# Patient Record
Sex: MALE | Race: WHITE | NOT HISPANIC OR LATINO | Employment: FULL TIME | URBAN - NONMETROPOLITAN AREA
[De-identification: names, ages, dates, MRNs, and addresses within clinical notes are randomized per-mention and may not be internally consistent; named-entity substitution may affect disease eponyms.]

---

## 2018-06-01 ENCOUNTER — OFFICE VISIT (OUTPATIENT)
Dept: CARDIOLOGY | Facility: CLINIC | Age: 58
End: 2018-06-01

## 2018-06-01 VITALS
WEIGHT: 292 LBS | OXYGEN SATURATION: 98 % | SYSTOLIC BLOOD PRESSURE: 142 MMHG | HEART RATE: 75 BPM | BODY MASS INDEX: 41.8 KG/M2 | HEIGHT: 70 IN | DIASTOLIC BLOOD PRESSURE: 93 MMHG

## 2018-06-01 DIAGNOSIS — I87.2 VENOUS INSUFFICIENCY: Primary | ICD-10-CM

## 2018-06-01 DIAGNOSIS — I83.813 VARICOSE VEINS OF BOTH LOWER EXTREMITIES WITH PAIN: ICD-10-CM

## 2018-06-01 PROCEDURE — 99204 OFFICE O/P NEW MOD 45 MIN: CPT | Performed by: INTERNAL MEDICINE

## 2018-06-01 PROCEDURE — 93000 ELECTROCARDIOGRAM COMPLETE: CPT | Performed by: INTERNAL MEDICINE

## 2018-06-01 RX ORDER — MAGNESIUM OXIDE 400 MG/1
400 TABLET ORAL DAILY
COMMUNITY

## 2018-06-01 RX ORDER — IBUPROFEN 600 MG/1
600 TABLET ORAL EVERY 6 HOURS PRN
COMMUNITY

## 2018-06-01 NOTE — PROGRESS NOTES
[facility] CARDIOLOGY  [address] [place] [address]  [place] [address]  Phone: [phone]  Fax: [phone]    06/01/2018    Chief Complaint: lower extremity swelling, Varicose veins. Leg pain    History:   Leo Da Silva is a 57 y.o. male seen in consultation, referred by Dr.Mohammad Sol MD  for Venous Insufficiency. He has suffered from bilateral leg pain and swelling for several months and tried pressure stockings which have not worked. He denies ulceration and has been taking pain pills to help his leg pain.   He has had a Nuclear stress test in 2015 which was normal.    Past Medical History:   Diagnosis Date   • CHF (congestive heart failure)    • Hypertension    • Sleep apnea    • Venous insufficiency        Review of Systems:  Please see HPI  Constitution: No chills, no rigors, no unexplained weight loss or weight gain  Eyes:  No diplopia, no blurred vision, no loss of vision, conjunctiva is pink and sclera is anicteric  ENT:  No tinnitus, no otorrhea, no epistaxis, no sore throat   Respiratory: No cough, no hemoptysis  Cardiovascular: see HPI  Gastrointestinal: No nausea, no vomiting, no hematemesis, no diarrhea or constipation, no melena  Genitourinary: No frequency of dysuria no hematuria  Integument: No pruritis and  no skin rash  Hematologic / Lymphatic: No excessive bleeding, easy bruising, fatigue, lymphadenopathy and petechiae  Musculoskeletal: No joint pain, joint stiffness, joint swelling, muscle pain, muscle weakness and neck pain  Neurological: No dizziness, headaches, light headedness, seizures and vertigo  Endocrine: No frequent urination and nocturia, temperature intolerance, weight gain, unintended and weight loss, unintended        Past Social History:  Social History     Social History   • Marital status:      Social History Main Topics   • Smoking status: Never Smoker   • Alcohol use No   • Drug use: No   • Sexual activity: Defer     Other Topics Concern   •  Not on file       Past Family History:  History reviewed. No pertinent family history.    Current Outpatient Prescriptions on File Prior to Visit   Medication Sig Dispense Refill   • potassium chloride (K-DUR) 10 MEQ CR tablet Take 10 mEq by mouth every night.     • pramipexole (MIRAPEX) 0.5 MG tablet Take 1 mg by mouth Every Night.     • ramipril (ALTACE) 2.5 MG capsule Take 2.5 mg by mouth daily.     • metaxalone (SKELAXIN) 800 MG tablet Take 1 tablet by mouth 3 (three) times a day as needed for muscle spasms. 15 tablet 0   • naproxen (EC NAPROSYN) 500 MG EC tablet Take 1 tablet by mouth 2 (two) times a day with meals. 20 tablet 0   • traMADol (ULTRAM) 50 MG tablet Take 50 mg by mouth every night.     • [DISCONTINUED] Triamterene-HCTZ (MAXZIDE-25 PO) Take 37 mg by mouth daily.       No current facility-administered medications on file prior to visit.        No Known Allergies    Objective:  There were no vitals filed for this visit.      Comfortable NAD  PERRL, conjunctiva clear  Neck supple, no JVD or thyromegaly appreciated  S1/S2 RRR, no m/r/g  Lungs CTA B, normal effort  Abdomen S/NT/ND (+) BS, no HSM appreciated  Extremities warm, no clubbing, cyanosis, or edema  Normal gait  No visible or palpable skin lesions  A/Ox4, mood and affect appropriate  Pulse exam:   BILATERAL LEG EDEMA LEFT WAS WORSE.  BILAT VARICOSE VEINS NOTED RIGHT WORSE  STRONG 3+ dp AND PT PULSES    DATA:       Results for orders placed in visit on 05/19/15   SCANNED - ECHOCARDIOGRAM       Results for orders placed during the hospital encounter of 06/26/15   Stress test with myocardial perfusion    Narrative Patient:      ANNE-MARIE HUANG    Med Rec#:     [id number]            :          1960            Date:         2015            Age:          54y                   Account#:     [id number]           Height:       177.8 cm / 70.0 in  Accession#:   [id number]               Sex:          M                      Weight:       129.54 kg  / 285.5 lbs  BSA:          2.43  Admit Date#:  06/26/2015            Loc:          exam room 1    Referring:    KERMIT  Referring:    KERMIT  Performing:   DG  Reading:      Prabhakar Calderon MD  Reading:      Cristhian Beaver MD   [employer] [place]  Nuclear [facility] Dann Elise CNMT  ______________________________________________________________________    Combined Nuclear/Stress Test    ICD Codes:     • 786.50 Chest pain, unspecified     Checklists:     • Patient verbally identified self  • Patient identified by ID band  • Patient consent obtained in lab  • Procedure verified and explained to patient  • History and physical performed  • Last Caffeine 06/25/2015 19:00:00  • Last Meal 06/25/2015 19:00:00    History of:   • Hypertension• Family History of CHF• Typical Angina•  Paroxysmal nocturnal dyspnea• Dyspnea with minimal exertion• Dyspnea  with normal daily activity• Dyspnea with more than normal activityNo  History of:• Dyslipidemia• Reactive Airway Disease• Chronic Lung  Disease• Renal Failure• CHF• Coronary Artery Disease (CAD)• Peripheral  Vascular Disease• Cerebrovascular Disease• Family History of CAD  Current Clinical Presentation:  The patient presented with typical angina.     Medications I :  • SEE PT. MED. LIST         Discharge At Completion of Exam :  • Home     Baseline ECG:  Normal sinus rhythm. Normal repolarization.   Exercise Protocol:  The patient exercised for a total of 07:30 min using the Standard Ángel  exercise protocol, achieving stage 3 and a max METs of 10.1. Maximum  heart rate was 148 bpm, 90% MPHR. RPP 86657.   Stress ECG :  Sinus tachycardia. ST segment response to stress was normal.   Recovery ECG:  Sinus tachycardia. Borderline ST-segment depression (0.5 - 0.9mm).   Hemodynamics                   Rest        Stress        Recovery      SBP         128 mmHg    160 mmHg      136 mmHg      DBP         79 mmHg     77 mmHg       75 mmHg         HR =       62 bpm      148 bpm       86 bpm        %MPHR                   90 %                          Imaging Protocol:  A one day rest-stress imaging protocol was followed using Tc-99m  sestamibi (Cardiolite) injected intravenously. For the rest portion of  the study, 10.5 mCi of the radiopharmaceutical was administered at  2015 07:50:03. For the stress portion of the study, 30.4 mCi was  administered at 2015 09:45:10.     Perfusion Interpretation:  TID Ratio 0.98.     Wall Motion Interpretation:  Gated imaging under post-stress conditions demonstrated normal wall  motion.   The patient's calculated post stress LVEF was 70%. The patient's end  diastolic volume was 133ml. The patient's end systolic volume was  39ml.     Nuclear Cardiology Conclusion:  Normal LV perfusion.  Normal regadenoson myocardial perfusion   with Tc-99m sestamibi imaging.   Stress testing induced no chest pain symptoms and a non-diagnostic ECG  response.  Normal LV size. Global left ventricular systolic function was  normal, with an EF of 70%.  In addition, there was normal wall motion.  No prior study was available for comparison.       Conclusions  *Normal regadenoson myocardial perfusion   with Tc-99m sestamibi  imaging.  *Stress testing induced no chest pain symptoms and a non-diagnostic ECG  response.  *Global left ventricular systolic function was normal, with an EF of  70%.  *No prior study was available for comparison.  *Gated imaging under post-stress conditions demonstrated normal wall  motion.      Electronically signed by: Cristhian Beaver MD on 2015  16:01:10  Thank you for the courtesy of this referral.  Amended Report    Patient:      ANNE-MARIE HUANG   Med Rec#:     [id number]            :          1960            Date:         2015            Age:          54y                   Account#:     [id number]           Height:       177.8 cm / 70.0 in  Accession#:   [id number]               Sex:           M                      Weight:       129.54 kg / 285.5 lbs  BSA:          2.43  Admit Date#:  06/26/2015            Loc:          exam room 1    Referring:    KERMIT  Referring:    KERMIT  Performing:   DG  Reading:      Prabhakar Calderon MD  Reading:      Cristhian Beaver MD   [employer] [place]  Nuclear Med Dann Elise CNMT  ______________________________________________________________________    Combined Nuclear/Stress Test    ICD Codes:     • 786.50 Chest pain, unspecified     Checklists:     • Patient verbally identified self  • Patient identified by ID band  • Patient consent obtained in lab  • Procedure verified and explained to patient  • History and physical performed  • Last Caffeine 06/25/2015 19:00:00  • Last Meal 06/25/2015 19:00:00    History of:   • Hypertension• Family History of CHF• Typical Angina•  Paroxysmal nocturnal dyspnea• Dyspnea with minimal exertion• Dyspnea  with normal daily activity• Dyspnea with more than normal activityNo  History of:• Dyslipidemia• Reactive Airway Disease• Chronic Lung  Disease• Renal Failure• CHF• Coronary Artery Disease (CAD)• Peripheral  Vascular Disease• Cerebrovascular Disease• Family History of CAD  Current Clinical Presentation:  The patient presented with typical angina.     Medications I :  • SEE PT. MED. LIST         Discharge At Completion of Exam :  • Home     Baseline ECG:  Normal sinus rhythm. Normal repolarization.   Exercise Protocol:  The patient exercised for a total of 07:30 min using the Standard Ángel  exercise protocol, achieving stage 3 and a max METs of 10.1. Maximum  heart rate was 148 bpm, 90% MPHR. RPP 97005.   Stress ECG :  Sinus tachycardia. ST segment response to stress was normal.   Recovery ECG:  Sinus tachycardia. Borderline ST-segment depression (0.5 - 0.9mm).   Hemodynamics                   Rest        Stress        Recovery      SBP         128 mmHg    160 mmHg      136 mmHg       DBP         79 mmHg     77 mmHg       75 mmHg        HR =       62 bpm      148 bpm       86 bpm        %MPHR                   90 %                          Imaging Protocol:  A one day rest-stress imaging protocol was followed using Tc-99m  sestamibi (Cardiolite) injected intravenously. For the rest portion of  the study, 10.5 mCi of the radiopharmaceutical was administered at  2015 07:50:03. For the stress portion of the study, 30.4 mCi was  administered at 2015 09:45:10.     Perfusion Interpretation:  TID Ratio 0.98.     Wall Motion Interpretation:  Gated imaging under post-stress conditions demonstrated normal wall  motion.   The patient's calculated post stress LVEF was 70%. The patient's end  diastolic volume was 133ml. The patient's end systolic volume was  39ml.     Nuclear Cardiology Conclusion:  Normal LV perfusion.  Normal exercise myocardial perfusion   with Tc-99m sestamibi imaging.   Stress testing induced no chest pain symptoms and a non-diagnostic ECG  response.  Normal LV size. Global left ventricular systolic function was  normal, with an EF of 70%.  In addition, there was normal wall motion.  No prior study was available for comparison.       Conclusions  *Normal exercise myocardial perfusion   with Tc-99m sestamibi imaging.  *Stress testing induced no chest pain symptoms and a non-diagnostic ECG  response.  *Global left ventricular systolic function was normal, with an EF of  70%.  *No prior study was available for comparison.  *Gated imaging under post-stress conditions demonstrated normal wall  motion.      Electronically signed by: Cristhian Beaver MD on 2015  08:37:45  Thank you for the courtesy of this referral.       Results for orders placed during the hospital encounter of 06/26/15   Stress test with myocardial perfusion    Narrative Patient:      ANNE-MARIE HUANG    Med Rec#:     [id number]            :          1960            Date:         2015             Age:          54y                   Account#:     [id number]           Height:       177.8 cm / 70.0 in  Accession#:   [id number]               Sex:          M                      Weight:       129.54 kg / 285.5 lbs  BSA:          2.43  Admit Date#:  06/26/2015            Loc:          exam room 1    Referring:    ESTELASANTIAGOJANELL  Referring:    ESTELAJAZIELVON  Performing:   DG  Reading:      Prabhakar Calderon MD  Reading:      Cristhian Beaver MD   [employer] [place]  Nuclear [facility] Dann Elise CNMT  ______________________________________________________________________    Combined Nuclear/Stress Test    ICD Codes:     • 786.50 Chest pain, unspecified     Checklists:     • Patient verbally identified self  • Patient identified by ID band  • Patient consent obtained in lab  • Procedure verified and explained to patient  • History and physical performed  • Last Caffeine 06/25/2015 19:00:00  • Last Meal 06/25/2015 19:00:00    History of:   • Hypertension• Family History of CHF• Typical Angina•  Paroxysmal nocturnal dyspnea• Dyspnea with minimal exertion• Dyspnea  with normal daily activity• Dyspnea with more than normal activityNo  History of:• Dyslipidemia• Reactive Airway Disease• Chronic Lung  Disease• Renal Failure• CHF• Coronary Artery Disease (CAD)• Peripheral  Vascular Disease• Cerebrovascular Disease• Family History of CAD  Current Clinical Presentation:  The patient presented with typical angina.     Medications I :  • SEE PT. MED. LIST         Discharge At Completion of Exam :  • Home     Baseline ECG:  Normal sinus rhythm. Normal repolarization.   Exercise Protocol:  The patient exercised for a total of 07:30 min using the Standard Ángel  exercise protocol, achieving stage 3 and a max METs of 10.1. Maximum  heart rate was 148 bpm, 90% MPHR. RPP 24348.   Stress ECG :  Sinus tachycardia. ST segment response to stress was normal.   Recovery ECG:  Sinus tachycardia. Borderline  ST-segment depression (0.5 - 0.9mm).   Hemodynamics                   Rest        Stress        Recovery      SBP         128 mmHg    160 mmHg      136 mmHg      DBP         79 mmHg     77 mmHg       75 mmHg        HR =       62 bpm      148 bpm       86 bpm        %MPHR                   90 %                          Imaging Protocol:  A one day rest-stress imaging protocol was followed using Tc-99m  sestamibi (Cardiolite) injected intravenously. For the rest portion of  the study, 10.5 mCi of the radiopharmaceutical was administered at  2015 07:50:03. For the stress portion of the study, 30.4 mCi was  administered at 2015 09:45:10.     Perfusion Interpretation:  TID Ratio 0.98.     Wall Motion Interpretation:  Gated imaging under post-stress conditions demonstrated normal wall  motion.   The patient's calculated post stress LVEF was 70%. The patient's end  diastolic volume was 133ml. The patient's end systolic volume was  39ml.     Nuclear Cardiology Conclusion:  Normal LV perfusion.  Normal regadenoson myocardial perfusion   with Tc-99m sestamibi imaging.   Stress testing induced no chest pain symptoms and a non-diagnostic ECG  response.  Normal LV size. Global left ventricular systolic function was  normal, with an EF of 70%.  In addition, there was normal wall motion.  No prior study was available for comparison.       Conclusions  *Normal regadenoson myocardial perfusion   with Tc-99m sestamibi  imaging.  *Stress testing induced no chest pain symptoms and a non-diagnostic ECG  response.  *Global left ventricular systolic function was normal, with an EF of  70%.  *No prior study was available for comparison.  *Gated imaging under post-stress conditions demonstrated normal wall  motion.      Electronically signed by: Cristhian Beaver MD on 2015  16:01:10  Thank you for the courtesy of this referral.  Amended Report    Patient:      ANNE-MARIE HUANG   Med Rec#:     [id number]            :           1960            Date:         06/26/2015            Age:          54y                   Account#:     [id number]           Height:       177.8 cm / 70.0 in  Accession#:   [id number]               Sex:          M                      Weight:       129.54 kg / 285.5 lbs  BSA:          2.43  Admit Date#:  06/26/2015            Loc:          exam room 1    Referring:    KERMIT  Referring:    KERMIT  Performing:   DG  Reading:      Prabhakar Calderon MD  Reading:      Cristhian Beaver MD   [employer] [place]  Nuclear Med Dann Elise CNMT  ______________________________________________________________________    Combined Nuclear/Stress Test    ICD Codes:     • 786.50 Chest pain, unspecified     Checklists:     • Patient verbally identified self  • Patient identified by ID band  • Patient consent obtained in lab  • Procedure verified and explained to patient  • History and physical performed  • Last Caffeine 06/25/2015 19:00:00  • Last Meal 06/25/2015 19:00:00    History of:   • Hypertension• Family History of CHF• Typical Angina•  Paroxysmal nocturnal dyspnea• Dyspnea with minimal exertion• Dyspnea  with normal daily activity• Dyspnea with more than normal activityNo  History of:• Dyslipidemia• Reactive Airway Disease• Chronic Lung  Disease• Renal Failure• CHF• Coronary Artery Disease (CAD)• Peripheral  Vascular Disease• Cerebrovascular Disease• Family History of CAD  Current Clinical Presentation:  The patient presented with typical angina.     Medications I :  • SEE PT. MED. LIST         Discharge At Completion of Exam :  • Home     Baseline ECG:  Normal sinus rhythm. Normal repolarization.   Exercise Protocol:  The patient exercised for a total of 07:30 min using the Standard Ángel  exercise protocol, achieving stage 3 and a max METs of 10.1. Maximum  heart rate was 148 bpm, 90% MPHR. RPP 73908.   Stress ECG :  Sinus tachycardia. ST segment response to stress was  normal.   Recovery ECG:  Sinus tachycardia. Borderline ST-segment depression (0.5 - 0.9mm).   Hemodynamics                   Rest        Stress        Recovery      SBP         128 mmHg    160 mmHg      136 mmHg      DBP         79 mmHg     77 mmHg       75 mmHg        HR =       62 bpm      148 bpm       86 bpm        %MPHR                   90 %                          Imaging Protocol:  A one day rest-stress imaging protocol was followed using Tc-99m  sestamibi (Cardiolite) injected intravenously. For the rest portion of  the study, 10.5 mCi of the radiopharmaceutical was administered at  06/26/2015 07:50:03. For the stress portion of the study, 30.4 mCi was  administered at 06/26/2015 09:45:10.     Perfusion Interpretation:  TID Ratio 0.98.     Wall Motion Interpretation:  Gated imaging under post-stress conditions demonstrated normal wall  motion.   The patient's calculated post stress LVEF was 70%. The patient's end  diastolic volume was 133ml. The patient's end systolic volume was  39ml.     Nuclear Cardiology Conclusion:  Normal LV perfusion.  Normal exercise myocardial perfusion   with Tc-99m sestamibi imaging.   Stress testing induced no chest pain symptoms and a non-diagnostic ECG  response.  Normal LV size. Global left ventricular systolic function was  normal, with an EF of 70%.  In addition, there was normal wall motion.  No prior study was available for comparison.       Conclusions  *Normal exercise myocardial perfusion   with Tc-99m sestamibi imaging.  *Stress testing induced no chest pain symptoms and a non-diagnostic ECG  response.  *Global left ventricular systolic function was normal, with an EF of  70%.  *No prior study was available for comparison.  *Gated imaging under post-stress conditions demonstrated normal wall  motion.      Electronically signed by: Cristhian Beaver MD on 06/30/2015  08:37:45  Thank you for the courtesy of this referral.              ECG 12 Lead  Date/Time:  6/1/2018 11:05 AM  Performed by: LYRIC BONILLA  Authorized by: LYRIC BONILLA            A/P:  Bilateral Varicose Veins advanced symptomatic  Bilateral leg edema left worse than right  Failed Pressure stockings for greater than 3 months  No evidence of PAD  Normal LV function 70%    PLAN  Bilateral venous reflux ultrasound  Schedule for [facility] on June 20        Thank you for allowing me to participate in the care of Leo BRYCE Da Silva. Feel free to contact me directly with any further questions or concerns.

## 2018-06-13 ENCOUNTER — APPOINTMENT (OUTPATIENT)
Dept: CARDIOLOGY | Facility: HOSPITAL | Age: 58
End: 2018-06-13
Attending: INTERNAL MEDICINE

## 2018-06-13 DIAGNOSIS — I87.2 VENOUS INSUFFICIENCY: Primary | ICD-10-CM

## 2018-06-14 ENCOUNTER — HOSPITAL ENCOUNTER (OUTPATIENT)
Dept: CARDIOLOGY | Facility: HOSPITAL | Age: 58
Discharge: HOME | End: 2018-06-14
Attending: INTERNAL MEDICINE | Admitting: INTERNAL MEDICINE

## 2018-06-14 PROCEDURE — 93970 EXTREMITY STUDY: CPT | Performed by: RADIOLOGY

## 2018-06-14 PROCEDURE — 93970 EXTREMITY STUDY: CPT

## 2018-06-19 ENCOUNTER — APPOINTMENT (OUTPATIENT)
Dept: CARDIOLOGY | Facility: HOSPITAL | Age: 58
End: 2018-06-19
Attending: INTERNAL MEDICINE

## 2018-08-14 ENCOUNTER — TELEPHONE (OUTPATIENT)
Dept: CARDIOLOGY | Facility: CLINIC | Age: 58
End: 2018-08-14

## 2018-08-14 DIAGNOSIS — I87.2 VENOUS INSUFFICIENCY: Primary | ICD-10-CM

## 2018-08-14 NOTE — TELEPHONE ENCOUNTER
----- Message from Eulalio Cole MD sent at 8/7/2018 10:52 AM EDT -----  Needs reflux study with times.

## 2018-08-14 NOTE — TELEPHONE ENCOUNTER
Called Leo to let him know that per Dr. Cole the test that was previously done we will need to have redone so that we can collect additional data. He should expect a call from scheduling to get this set up. He states he has an appointment with Dr. Horton on 8/17/18 at 4:30. I told him I would make a note of this in the order so that scheduling can be made aware.     He understands and is aware.

## 2018-08-21 ENCOUNTER — HOSPITAL ENCOUNTER (OUTPATIENT)
Dept: CARDIOLOGY | Facility: HOSPITAL | Age: 58
Discharge: HOME | End: 2018-08-21
Attending: INTERNAL MEDICINE | Admitting: INTERNAL MEDICINE

## 2018-08-21 DIAGNOSIS — I87.2 VENOUS INSUFFICIENCY: ICD-10-CM

## 2018-08-21 PROCEDURE — 93970 EXTREMITY STUDY: CPT | Performed by: RADIOLOGY

## 2018-08-21 PROCEDURE — 93970 EXTREMITY STUDY: CPT

## 2020-03-24 ENCOUNTER — LAB REQUISITION (OUTPATIENT)
Dept: LAB | Facility: HOSPITAL | Age: 60
End: 2020-03-24

## 2020-03-24 DIAGNOSIS — Z00.00 ROUTINE GENERAL MEDICAL EXAMINATION AT A HEALTH CARE FACILITY: ICD-10-CM

## 2020-03-24 PROCEDURE — 86706 HEP B SURFACE ANTIBODY: CPT | Performed by: NURSE PRACTITIONER

## 2020-03-25 LAB — HBV SURFACE AB SER RIA-ACNC: REACTIVE

## 2020-12-11 ENCOUNTER — TRANSCRIBE ORDERS (OUTPATIENT)
Dept: ADMINISTRATIVE | Facility: HOSPITAL | Age: 60
End: 2020-12-11

## 2020-12-11 ENCOUNTER — HOSPITAL ENCOUNTER (OUTPATIENT)
Dept: GENERAL RADIOLOGY | Facility: HOSPITAL | Age: 60
Discharge: HOME | End: 2020-12-11
Admitting: NURSE PRACTITIONER

## 2020-12-11 DIAGNOSIS — M25.562 LEFT KNEE PAIN, UNSPECIFIED CHRONICITY: Primary | ICD-10-CM

## 2020-12-11 DIAGNOSIS — M25.562 LEFT KNEE PAIN, UNSPECIFIED CHRONICITY: ICD-10-CM

## 2020-12-11 PROCEDURE — 73562 X-RAY EXAM OF KNEE 3: CPT | Performed by: RADIOLOGY

## 2020-12-11 PROCEDURE — 73562 X-RAY EXAM OF KNEE 3: CPT

## 2021-02-11 ENCOUNTER — IMMUNIZATION (OUTPATIENT)
Dept: VACCINE CLINIC | Facility: HOSPITAL | Age: 61
End: 2021-02-11

## 2021-02-11 PROCEDURE — 0001A: CPT | Performed by: INTERNAL MEDICINE

## 2021-02-11 PROCEDURE — 91300 HC SARSCOV02 VAC 30MCG/0.3ML IM: CPT | Performed by: INTERNAL MEDICINE

## 2021-03-04 ENCOUNTER — IMMUNIZATION (OUTPATIENT)
Dept: VACCINE CLINIC | Facility: HOSPITAL | Age: 61
End: 2021-03-04

## 2021-03-04 PROCEDURE — 91300 HC SARSCOV02 VAC 30MCG/0.3ML IM: CPT | Performed by: INTERNAL MEDICINE

## 2021-03-04 PROCEDURE — 0002A: CPT | Performed by: INTERNAL MEDICINE

## 2024-06-18 ENCOUNTER — APPOINTMENT (OUTPATIENT)
Dept: CT IMAGING | Facility: HOSPITAL | Age: 64
End: 2024-06-18
Payer: COMMERCIAL

## 2024-06-18 ENCOUNTER — HOSPITAL ENCOUNTER (EMERGENCY)
Facility: HOSPITAL | Age: 64
Discharge: HOME | End: 2024-06-19
Attending: STUDENT IN AN ORGANIZED HEALTH CARE EDUCATION/TRAINING PROGRAM
Payer: COMMERCIAL

## 2024-06-18 DIAGNOSIS — K42.9 PERIUMBILICAL HERNIA: Primary | ICD-10-CM

## 2024-06-18 LAB
ALBUMIN SERPL-MCNC: 4.2 G/DL (ref 3.5–5.2)
ALBUMIN/GLOB SERPL: 1.4 G/DL
ALP SERPL-CCNC: 85 U/L (ref 39–117)
ALT SERPL W P-5'-P-CCNC: 36 U/L (ref 1–41)
ANION GAP SERPL CALCULATED.3IONS-SCNC: 12.7 MMOL/L (ref 5–15)
AST SERPL-CCNC: 28 U/L (ref 1–40)
BASOPHILS # BLD AUTO: 0.03 10*3/MM3 (ref 0–0.2)
BASOPHILS NFR BLD AUTO: 0.3 % (ref 0–1.5)
BILIRUB SERPL-MCNC: 0.9 MG/DL (ref 0–1.2)
BILIRUB UR QL STRIP: NEGATIVE
BUN SERPL-MCNC: 9 MG/DL (ref 8–23)
BUN/CREAT SERPL: 9.2 (ref 7–25)
CALCIUM SPEC-SCNC: 9.4 MG/DL (ref 8.6–10.5)
CHLORIDE SERPL-SCNC: 101 MMOL/L (ref 98–107)
CLARITY UR: CLEAR
CO2 SERPL-SCNC: 22.3 MMOL/L (ref 22–29)
COLOR UR: YELLOW
CREAT SERPL-MCNC: 0.98 MG/DL (ref 0.76–1.27)
CRP SERPL-MCNC: 1.68 MG/DL (ref 0–0.5)
D-LACTATE SERPL-SCNC: 1.5 MMOL/L (ref 0.5–2)
DEPRECATED RDW RBC AUTO: 45.8 FL (ref 37–54)
EGFRCR SERPLBLD CKD-EPI 2021: 85.6 ML/MIN/1.73
EOSINOPHIL # BLD AUTO: 0.01 10*3/MM3 (ref 0–0.4)
EOSINOPHIL NFR BLD AUTO: 0.1 % (ref 0.3–6.2)
ERYTHROCYTE [DISTWIDTH] IN BLOOD BY AUTOMATED COUNT: 12.2 % (ref 12.3–15.4)
GLOBULIN UR ELPH-MCNC: 3 GM/DL
GLUCOSE SERPL-MCNC: 117 MG/DL (ref 65–99)
GLUCOSE UR STRIP-MCNC: NEGATIVE MG/DL
HCT VFR BLD AUTO: 45.2 % (ref 37.5–51)
HGB BLD-MCNC: 16 G/DL (ref 13–17.7)
HGB UR QL STRIP.AUTO: NEGATIVE
HOLD SPECIMEN: NORMAL
HOLD SPECIMEN: NORMAL
IMM GRANULOCYTES # BLD AUTO: 0.11 10*3/MM3 (ref 0–0.05)
IMM GRANULOCYTES NFR BLD AUTO: 1.2 % (ref 0–0.5)
KETONES UR QL STRIP: ABNORMAL
LEUKOCYTE ESTERASE UR QL STRIP.AUTO: NEGATIVE
LIPASE SERPL-CCNC: 20 U/L (ref 13–60)
LYMPHOCYTES # BLD AUTO: 1.31 10*3/MM3 (ref 0.7–3.1)
LYMPHOCYTES NFR BLD AUTO: 14 % (ref 19.6–45.3)
MCH RBC QN AUTO: 35.9 PG (ref 26.6–33)
MCHC RBC AUTO-ENTMCNC: 35.4 G/DL (ref 31.5–35.7)
MCV RBC AUTO: 101.3 FL (ref 79–97)
MONOCYTES # BLD AUTO: 0.56 10*3/MM3 (ref 0.1–0.9)
MONOCYTES NFR BLD AUTO: 6 % (ref 5–12)
NEUTROPHILS NFR BLD AUTO: 7.35 10*3/MM3 (ref 1.7–7)
NEUTROPHILS NFR BLD AUTO: 78.4 % (ref 42.7–76)
NITRITE UR QL STRIP: NEGATIVE
NRBC BLD AUTO-RTO: 0 /100 WBC (ref 0–0.2)
PH UR STRIP.AUTO: 5.5 [PH] (ref 5–8)
PLATELET # BLD AUTO: 168 10*3/MM3 (ref 140–450)
PMV BLD AUTO: 10.8 FL (ref 6–12)
POTASSIUM SERPL-SCNC: 4.3 MMOL/L (ref 3.5–5.2)
PROT SERPL-MCNC: 7.2 G/DL (ref 6–8.5)
PROT UR QL STRIP: NEGATIVE
RBC # BLD AUTO: 4.46 10*6/MM3 (ref 4.14–5.8)
SODIUM SERPL-SCNC: 136 MMOL/L (ref 136–145)
SP GR UR STRIP: >1.03 (ref 1–1.03)
UROBILINOGEN UR QL STRIP: ABNORMAL
WBC NRBC COR # BLD AUTO: 9.37 10*3/MM3 (ref 3.4–10.8)
WHOLE BLOOD HOLD COAG: NORMAL
WHOLE BLOOD HOLD SPECIMEN: NORMAL

## 2024-06-18 PROCEDURE — 36415 COLL VENOUS BLD VENIPUNCTURE: CPT | Performed by: STUDENT IN AN ORGANIZED HEALTH CARE EDUCATION/TRAINING PROGRAM

## 2024-06-18 PROCEDURE — 25010000002 ONDANSETRON PER 1 MG: Performed by: EMERGENCY MEDICINE

## 2024-06-18 PROCEDURE — 25010000002 MORPHINE PER 10 MG: Performed by: STUDENT IN AN ORGANIZED HEALTH CARE EDUCATION/TRAINING PROGRAM

## 2024-06-18 PROCEDURE — 96375 TX/PRO/DX INJ NEW DRUG ADDON: CPT

## 2024-06-18 PROCEDURE — 86140 C-REACTIVE PROTEIN: CPT | Performed by: PHYSICIAN ASSISTANT

## 2024-06-18 PROCEDURE — 25010000002 ONDANSETRON PER 1 MG

## 2024-06-18 PROCEDURE — 83605 ASSAY OF LACTIC ACID: CPT | Performed by: STUDENT IN AN ORGANIZED HEALTH CARE EDUCATION/TRAINING PROGRAM

## 2024-06-18 PROCEDURE — 96374 THER/PROPH/DIAG INJ IV PUSH: CPT

## 2024-06-18 PROCEDURE — 80053 COMPREHEN METABOLIC PANEL: CPT | Performed by: STUDENT IN AN ORGANIZED HEALTH CARE EDUCATION/TRAINING PROGRAM

## 2024-06-18 PROCEDURE — 25510000001 IOPAMIDOL 61 % SOLUTION: Performed by: STUDENT IN AN ORGANIZED HEALTH CARE EDUCATION/TRAINING PROGRAM

## 2024-06-18 PROCEDURE — 96376 TX/PRO/DX INJ SAME DRUG ADON: CPT

## 2024-06-18 PROCEDURE — 74177 CT ABD & PELVIS W/CONTRAST: CPT

## 2024-06-18 PROCEDURE — 83690 ASSAY OF LIPASE: CPT | Performed by: STUDENT IN AN ORGANIZED HEALTH CARE EDUCATION/TRAINING PROGRAM

## 2024-06-18 PROCEDURE — 93010 ELECTROCARDIOGRAM REPORT: CPT | Performed by: INTERNAL MEDICINE

## 2024-06-18 PROCEDURE — 25010000002 MORPHINE PER 10 MG: Performed by: EMERGENCY MEDICINE

## 2024-06-18 PROCEDURE — 25810000003 SODIUM CHLORIDE 0.9 % SOLUTION

## 2024-06-18 PROCEDURE — 99285 EMERGENCY DEPT VISIT HI MDM: CPT

## 2024-06-18 PROCEDURE — 85025 COMPLETE CBC W/AUTO DIFF WBC: CPT | Performed by: STUDENT IN AN ORGANIZED HEALTH CARE EDUCATION/TRAINING PROGRAM

## 2024-06-18 PROCEDURE — 93005 ELECTROCARDIOGRAM TRACING: CPT

## 2024-06-18 PROCEDURE — 81003 URINALYSIS AUTO W/O SCOPE: CPT | Performed by: STUDENT IN AN ORGANIZED HEALTH CARE EDUCATION/TRAINING PROGRAM

## 2024-06-18 RX ORDER — OXYCODONE AND ACETAMINOPHEN 10; 325 MG/1; MG/1
1 TABLET ORAL ONCE
Status: DISCONTINUED | OUTPATIENT
Start: 2024-06-18 | End: 2024-06-19 | Stop reason: HOSPADM

## 2024-06-18 RX ORDER — ONDANSETRON 2 MG/ML
4 INJECTION INTRAMUSCULAR; INTRAVENOUS ONCE
Status: COMPLETED | OUTPATIENT
Start: 2024-06-18 | End: 2024-06-18

## 2024-06-18 RX ORDER — ONDANSETRON 2 MG/ML
4 INJECTION INTRAMUSCULAR; INTRAVENOUS ONCE
Status: DISCONTINUED | OUTPATIENT
Start: 2024-06-18 | End: 2024-06-18

## 2024-06-18 RX ORDER — HYDROCODONE BITARTRATE AND ACETAMINOPHEN 5; 325 MG/1; MG/1
1 TABLET ORAL ONCE
Status: COMPLETED | OUTPATIENT
Start: 2024-06-18 | End: 2024-06-18

## 2024-06-18 RX ORDER — SODIUM CHLORIDE 0.9 % (FLUSH) 0.9 %
10 SYRINGE (ML) INJECTION AS NEEDED
Status: DISCONTINUED | OUTPATIENT
Start: 2024-06-18 | End: 2024-06-19 | Stop reason: HOSPADM

## 2024-06-18 RX ADMIN — MORPHINE SULFATE 4 MG: 4 INJECTION, SOLUTION INTRAMUSCULAR; INTRAVENOUS at 23:36

## 2024-06-18 RX ADMIN — HYDROCODONE BITARTRATE AND ACETAMINOPHEN 1 TABLET: 5; 325 TABLET ORAL at 21:35

## 2024-06-18 RX ADMIN — MORPHINE SULFATE 4 MG: 4 INJECTION, SOLUTION INTRAMUSCULAR; INTRAVENOUS at 18:31

## 2024-06-18 RX ADMIN — SODIUM CHLORIDE 1000 ML: 9 INJECTION, SOLUTION INTRAVENOUS at 18:31

## 2024-06-18 RX ADMIN — IOPAMIDOL 87 ML: 612 INJECTION, SOLUTION INTRAVENOUS at 19:10

## 2024-06-18 RX ADMIN — ONDANSETRON 4 MG: 2 INJECTION INTRAMUSCULAR; INTRAVENOUS at 18:31

## 2024-06-18 RX ADMIN — ONDANSETRON 4 MG: 2 INJECTION INTRAMUSCULAR; INTRAVENOUS at 23:35

## 2024-06-18 NOTE — ED PROVIDER NOTES
Subjective   History of Present Illness  Patient is a 63-year-old male with no significant past medical history who presents today with complaints of abdominal pain.  Patient reports that he has not been to a doctor in several years, however supposed to take antihypertensives which he has not taken in several years.  Patient reports that he had a hernia mesh placed 4 to 5 years ago and has had no complications however began having umbilical abdominal pain this morning.  Patient reports that he had to pick his mother-in-law up out of the floor a couple days ago but denies injuring himself at this time.  Patient reports some nausea and vomiting however denies any diarrhea, fever, or any other symptoms.  Patient also denies any dysuria, hematuria, any increased frequency.  Patient is alert and oriented and able to answer all questions appropriately.  Patient presents private vehicle with wife at bedside.    History provided by:  Patient   used: No        Review of Systems   Constitutional: Negative.  Negative for fever.   HENT: Negative.     Respiratory: Negative.     Cardiovascular: Negative.  Negative for chest pain.   Gastrointestinal:  Positive for abdominal pain and nausea.   Endocrine: Negative.    Genitourinary: Negative.  Negative for dysuria.   Skin: Negative.    Neurological: Negative.    Psychiatric/Behavioral: Negative.     All other systems reviewed and are negative.      Past Medical History:   Diagnosis Date    CHF (congestive heart failure)     Hypertension     Sleep apnea     Venous insufficiency        No Known Allergies    Past Surgical History:   Procedure Laterality Date    HERNIA REPAIR         Family History   Problem Relation Age of Onset    Heart disease Mother        Social History     Socioeconomic History    Marital status:    Tobacco Use    Smoking status: Never   Substance and Sexual Activity    Alcohol use: No    Drug use: No    Sexual activity: Defer            Objective   Physical Exam  Vitals and nursing note reviewed.   Constitutional:       General: He is not in acute distress.     Appearance: He is well-developed. He is not diaphoretic.   HENT:      Head: Normocephalic and atraumatic.      Right Ear: External ear normal.      Left Ear: External ear normal.      Nose: Nose normal.   Eyes:      Conjunctiva/sclera: Conjunctivae normal.      Pupils: Pupils are equal, round, and reactive to light.   Neck:      Vascular: No JVD.      Trachea: No tracheal deviation.   Cardiovascular:      Rate and Rhythm: Normal rate and regular rhythm.      Heart sounds: Normal heart sounds. No murmur heard.  Pulmonary:      Effort: Pulmonary effort is normal. No respiratory distress.      Breath sounds: Normal breath sounds. No wheezing.   Abdominal:      General: Bowel sounds are normal.      Palpations: Abdomen is soft.      Tenderness: There is abdominal tenderness in the periumbilical area.   Musculoskeletal:         General: No deformity. Normal range of motion.      Cervical back: Normal range of motion and neck supple.   Skin:     General: Skin is warm and dry.      Coloration: Skin is not pale.      Findings: No erythema or rash.   Neurological:      Mental Status: He is alert and oriented to person, place, and time.      Cranial Nerves: No cranial nerve deficit.   Psychiatric:         Behavior: Behavior normal.         Thought Content: Thought content normal.         Procedures       Results for orders placed or performed during the hospital encounter of 06/18/24   Comprehensive Metabolic Panel    Specimen: Blood   Result Value Ref Range    Glucose 117 (H) 65 - 99 mg/dL    BUN 9 8 - 23 mg/dL    Creatinine 0.98 0.76 - 1.27 mg/dL    Sodium 136 136 - 145 mmol/L    Potassium 4.3 3.5 - 5.2 mmol/L    Chloride 101 98 - 107 mmol/L    CO2 22.3 22.0 - 29.0 mmol/L    Calcium 9.4 8.6 - 10.5 mg/dL    Total Protein 7.2 6.0 - 8.5 g/dL    Albumin 4.2 3.5 - 5.2 g/dL    ALT (SGPT) 36 1 -  41 U/L    AST (SGOT) 28 1 - 40 U/L    Alkaline Phosphatase 85 39 - 117 U/L    Total Bilirubin 0.9 0.0 - 1.2 mg/dL    Globulin 3.0 gm/dL    A/G Ratio 1.4 g/dL    BUN/Creatinine Ratio 9.2 7.0 - 25.0    Anion Gap 12.7 5.0 - 15.0 mmol/L    eGFR 85.6 >60.0 mL/min/1.73   Lipase    Specimen: Blood   Result Value Ref Range    Lipase 20 13 - 60 U/L   Urinalysis With Microscopic If Indicated (No Culture) - Urine, Clean Catch    Specimen: Urine, Clean Catch   Result Value Ref Range    Color, UA Yellow Yellow, Straw    Appearance, UA Clear Clear    pH, UA 5.5 5.0 - 8.0    Specific Gravity, UA >1.030 (H) 1.005 - 1.030    Glucose, UA Negative Negative    Ketones, UA Trace (A) Negative    Bilirubin, UA Negative Negative    Blood, UA Negative Negative    Protein, UA Negative Negative    Leuk Esterase, UA Negative Negative    Nitrite, UA Negative Negative    Urobilinogen, UA 0.2 E.U./dL 0.2 - 1.0 E.U./dL   Lactic Acid, Plasma    Specimen: Blood   Result Value Ref Range    Lactate 1.5 0.5 - 2.0 mmol/L   CBC Auto Differential    Specimen: Blood   Result Value Ref Range    WBC 9.37 3.40 - 10.80 10*3/mm3    RBC 4.46 4.14 - 5.80 10*6/mm3    Hemoglobin 16.0 13.0 - 17.7 g/dL    Hematocrit 45.2 37.5 - 51.0 %    .3 (H) 79.0 - 97.0 fL    MCH 35.9 (H) 26.6 - 33.0 pg    MCHC 35.4 31.5 - 35.7 g/dL    RDW 12.2 (L) 12.3 - 15.4 %    RDW-SD 45.8 37.0 - 54.0 fl    MPV 10.8 6.0 - 12.0 fL    Platelets 168 140 - 450 10*3/mm3    Neutrophil % 78.4 (H) 42.7 - 76.0 %    Lymphocyte % 14.0 (L) 19.6 - 45.3 %    Monocyte % 6.0 5.0 - 12.0 %    Eosinophil % 0.1 (L) 0.3 - 6.2 %    Basophil % 0.3 0.0 - 1.5 %    Immature Grans % 1.2 (H) 0.0 - 0.5 %    Neutrophils, Absolute 7.35 (H) 1.70 - 7.00 10*3/mm3    Lymphocytes, Absolute 1.31 0.70 - 3.10 10*3/mm3    Monocytes, Absolute 0.56 0.10 - 0.90 10*3/mm3    Eosinophils, Absolute 0.01 0.00 - 0.40 10*3/mm3    Basophils, Absolute 0.03 0.00 - 0.20 10*3/mm3    Immature Grans, Absolute 0.11 (H) 0.00 - 0.05 10*3/mm3     nRBC 0.0 0.0 - 0.2 /100 WBC   C-reactive Protein    Specimen: Blood   Result Value Ref Range    C-Reactive Protein 1.68 (H) 0.00 - 0.50 mg/dL   ECG 12 Lead Other; epigastric pain   Result Value Ref Range    QT Interval 402 ms    QTC Interval 430 ms   Green Top (Gel)   Result Value Ref Range    Extra Tube Hold for add-ons.    Lavender Top   Result Value Ref Range    Extra Tube hold for add-on    Gold Top - SST   Result Value Ref Range    Extra Tube Hold for add-ons.    Light Blue Top   Result Value Ref Range    Extra Tube Hold for add-ons.        CT Abdomen Pelvis With Contrast   Final Result       Periumbilical hernia measuring 15 mm, containing mesenteric fat and mild   inflammatory change.  No incarcerated or strangulated loop of bowel.           This report was finalized on 6/18/2024 7:48 PM by Marissa Mccarty MD.                ED Course  ED Course as of 06/19/24 0011   Tue Jun 18, 2024 1929 EKG at 1917 showed sinus rhythm, rate 69.  FL interval 186, QRS duration 84, QTc 430 ms.  No apparent acute ischemia.  No evidence for STEMI.  Electronically signed by Deng Stephens MD, 06/18/24, 7:29 PM EDT.   [CM]   2116 CT Abdomen Pelvis With Contrast [SM]   2236 Patient was up for discharge, further evaluation will try to reduce umbilical hernia.  Given that there are some inflammatory changes there possible antibiotic prophylaxis. [RB]   Wed Jun 19, 2024   0010 Was able to reduce umbilical hernia abdominal binder applied.  Did discuss case with general surgeon Dr. Martini agreeable to see in outpatient clinic. [RB]      ED Course User Index  [CM] Deng Stephens MD  [RB] Salvatore Grayson II, PA  [SM] Bárbara Romero, KENNETH                                             Medical Decision Making  Patient is a 63-year-old male with no significant past medical history who presents today with complaints of abdominal pain.  Patient reports that he has not been to a doctor in several years, however supposed to  take antihypertensives which he has not taken in several years.  Patient reports that he had a hernia mesh placed 4 to 5 years ago and has had no complications however began having umbilical abdominal pain this morning.  Patient reports that he had to pick his mother-in-law up out of the floor a couple days ago but denies injuring himself at this time.  Patient reports some nausea and vomiting however denies any diarrhea, fever, or any other symptoms.  Patient also denies any dysuria, hematuria, any increased frequency.  Patient is alert and oriented and able to answer all questions appropriately.  Patient presents private vehicle with wife at bedside.    Advised patient to return to the ER with new or worsening symptoms.  Advised patient to follow-up with PCP.  Patient verbalized understanding and agrees.  Vital signs are stable at discharge.  Patient is in no acute distress.    Problems Addressed:  Periumbilical hernia: complicated acute illness or injury    Amount and/or Complexity of Data Reviewed  Labs: ordered.  Radiology: ordered. Decision-making details documented in ED Course.  ECG/medicine tests: ordered.    Risk  Prescription drug management.        Final diagnoses:   Periumbilical hernia       ED Disposition  ED Disposition       ED Disposition   Discharge    Condition   Stable    Comment   --               PATIENT CONNECTION - [place]  See Provider List  [facility] [address]  [phone]  Schedule an appointment as soon as possible for a visit       Kait Martini MD  [address] [facility] [address]  [facility] [address] [phone]    Schedule an appointment as soon as possible for a visit            Medication List      No changes were made to your prescriptions during this visit.            Bárbara Romero, KENNETH  06/18/24 2118       Salvatore Grayson II, PA  06/19/24 0012

## 2024-06-18 NOTE — Clinical Note
[facility] EMERGENCY DEPARTMENT  [address] [address] [facility] [address]  Phone: [phone]    Helena Da Silva accompanied Leo Da Silva to the emergency department on 6/18/2024. They may return to work on 06/20/2024.        Thank you for choosing [facility].    Salvatore Grayson II, PA

## 2024-06-19 VITALS
HEART RATE: 64 BPM | BODY MASS INDEX: 40.09 KG/M2 | DIASTOLIC BLOOD PRESSURE: 75 MMHG | OXYGEN SATURATION: 96 % | TEMPERATURE: 98.5 F | RESPIRATION RATE: 16 BRPM | WEIGHT: 280 LBS | SYSTOLIC BLOOD PRESSURE: 126 MMHG | HEIGHT: 70 IN

## 2024-06-19 LAB
QT INTERVAL: 402 MS
QTC INTERVAL: 430 MS

## 2024-06-19 RX ORDER — OXYCODONE AND ACETAMINOPHEN 7.5; 325 MG/1; MG/1
1 TABLET ORAL EVERY 6 HOURS PRN
Qty: 12 TABLET | Refills: 0 | Status: SHIPPED | OUTPATIENT
Start: 2024-06-19

## 2024-06-19 RX ORDER — NALOXONE HYDROCHLORIDE 4 MG/.1ML
SPRAY NASAL
Qty: 2 EACH | Refills: 0 | Status: SHIPPED | OUTPATIENT
Start: 2024-06-19